# Patient Record
Sex: MALE | Race: WHITE | Employment: OTHER | URBAN - METROPOLITAN AREA
[De-identification: names, ages, dates, MRNs, and addresses within clinical notes are randomized per-mention and may not be internally consistent; named-entity substitution may affect disease eponyms.]

---

## 2019-04-25 ENCOUNTER — ANESTHESIA EVENT (OUTPATIENT)
Dept: OPERATING ROOM | Age: 24
End: 2019-04-25
Payer: MEDICARE

## 2019-04-25 RX ORDER — BISACODYL 10 MG
10 SUPPOSITORY, RECTAL RECTAL DAILY PRN
COMMUNITY

## 2019-04-25 RX ORDER — FLUTICASONE PROPIONATE 50 MCG
2 SPRAY, SUSPENSION (ML) NASAL 2 TIMES DAILY
COMMUNITY

## 2019-04-25 NOTE — PROGRESS NOTES
Phone assessment completed with Traci Peterson, at [facility]. Pt is profound mr. Alert to self. Ambulates, non verbalr. No isolation and no open wounds. Pt has legal guardian. Docs on chart. Pt to be transported and accompanied by staff.

## 2019-04-25 NOTE — PROGRESS NOTES
[address] [facility] PRE-ADMISSION TESTING INSTRUCTIONS    The Preadmission Testing patient is instructed accordingly using the following criteria (check applicable):    ARRIVAL INSTRUCTIONS:  [x] Parking the day of Surgery is located in the Main Entrance lot. Upon entering the door, make an immediate right to the surgery reception desk    [x] Complimentary [address] Parking is available Monday through Friday 6 am to 6 pm    [x] Bring photo ID and insurance card    [] Bring in a copy of Living will or Durable Power of  papers. [x] Please be sure to arrange for responsible adult to provide transportation to and from the hospital    [x] Please arrange for responsible adult to be with you for the 24 hour period post procedure due to having anesthesia      GENERAL INSTRUCTIONS:    [x] Nothing by mouth after midnight, including gum, candy, mints or water    [x] You may brush your teeth, but do not swallow any water    [] Take medications as instructed with 1-2 oz of water    [] Stop herbal supplements and vitamins 5 days prior to procedure    [] Follow preop dosing of blood thinners per physician instructions    [] Take 1/2 dose of evening insulin, but no insulin after midnight    [] No oral diabetic medications after midnight    [] If diabetic and have low blood sugar or feel symptomatic, take 1-2oz apple juice only    [] Bring inhalers day of surgery    [] Bring C-PAP/ Bi-Pap day of surgery    [] Bring urine specimen day of surgery    [x] Shower or bath with soap, lather and rinse well, AM of Surgery, no lotion, powders or creams to surgical site    [] Follow bowel prep as instructed per surgeon    [x] No tobacco products within 24 hours of surgery     [x] No alcohol or illegal drug use within 24 hours of surgery.     [x] Jewelry, body piercing's, eyeglasses, contact lenses and dentures are not permitted into surgery (bring cases)      [] Please do not wear any nail polish, make up or hair

## 2019-04-26 ENCOUNTER — ANESTHESIA (OUTPATIENT)
Dept: OPERATING ROOM | Age: 24
End: 2019-04-26
Payer: MEDICARE

## 2019-04-26 ENCOUNTER — HOSPITAL ENCOUNTER (OUTPATIENT)
Age: 24
Setting detail: OUTPATIENT SURGERY
Discharge: OTHER INSTITUTION | End: 2019-04-26
Attending: DENTIST | Admitting: DENTIST
Payer: MEDICARE

## 2019-04-26 VITALS
RESPIRATION RATE: 3 BRPM | OXYGEN SATURATION: 99 % | DIASTOLIC BLOOD PRESSURE: 61 MMHG | SYSTOLIC BLOOD PRESSURE: 111 MMHG

## 2019-04-26 VITALS
WEIGHT: 132 LBS | RESPIRATION RATE: 18 BRPM | HEART RATE: 78 BPM | SYSTOLIC BLOOD PRESSURE: 122 MMHG | TEMPERATURE: 96.5 F | DIASTOLIC BLOOD PRESSURE: 91 MMHG | BODY MASS INDEX: 20 KG/M2 | HEIGHT: 68 IN | OXYGEN SATURATION: 97 %

## 2019-04-26 PROCEDURE — 6360000002 HC RX W HCPCS

## 2019-04-26 PROCEDURE — 3700000000 HC ANESTHESIA ATTENDED CARE: Performed by: DENTIST

## 2019-04-26 PROCEDURE — 3700000001 HC ADD 15 MINUTES (ANESTHESIA): Performed by: DENTIST

## 2019-04-26 PROCEDURE — 3600000002 HC SURGERY LEVEL 2 BASE: Performed by: DENTIST

## 2019-04-26 PROCEDURE — 7100000011 HC PHASE II RECOVERY - ADDTL 15 MIN: Performed by: DENTIST

## 2019-04-26 PROCEDURE — 2500000003 HC RX 250 WO HCPCS

## 2019-04-26 PROCEDURE — 3600000012 HC SURGERY LEVEL 2 ADDTL 15MIN: Performed by: DENTIST

## 2019-04-26 PROCEDURE — 7100000000 HC PACU RECOVERY - FIRST 15 MIN: Performed by: DENTIST

## 2019-04-26 PROCEDURE — 7100000001 HC PACU RECOVERY - ADDTL 15 MIN: Performed by: DENTIST

## 2019-04-26 PROCEDURE — 2580000003 HC RX 258

## 2019-04-26 PROCEDURE — 2709999900 HC NON-CHARGEABLE SUPPLY: Performed by: DENTIST

## 2019-04-26 PROCEDURE — 7100000010 HC PHASE II RECOVERY - FIRST 15 MIN: Performed by: DENTIST

## 2019-04-26 RX ORDER — FENTANYL CITRATE 50 UG/ML
INJECTION, SOLUTION INTRAMUSCULAR; INTRAVENOUS PRN
Status: DISCONTINUED | OUTPATIENT
Start: 2019-04-26 | End: 2019-04-26 | Stop reason: SDUPTHER

## 2019-04-26 RX ORDER — NEOSTIGMINE METHYLSULFATE 1 MG/ML
INJECTION, SOLUTION INTRAVENOUS PRN
Status: DISCONTINUED | OUTPATIENT
Start: 2019-04-26 | End: 2019-04-26 | Stop reason: SDUPTHER

## 2019-04-26 RX ORDER — MIDAZOLAM HYDROCHLORIDE 1 MG/ML
INJECTION INTRAMUSCULAR; INTRAVENOUS
Status: COMPLETED
Start: 2019-04-26 | End: 2019-04-26

## 2019-04-26 RX ORDER — LIDOCAINE HYDROCHLORIDE 20 MG/ML
INJECTION, SOLUTION EPIDURAL; INFILTRATION; INTRACAUDAL; PERINEURAL PRN
Status: DISCONTINUED | OUTPATIENT
Start: 2019-04-26 | End: 2019-04-26 | Stop reason: SDUPTHER

## 2019-04-26 RX ORDER — MIDAZOLAM HYDROCHLORIDE 1 MG/ML
2 INJECTION INTRAMUSCULAR; INTRAVENOUS ONCE
Status: COMPLETED | OUTPATIENT
Start: 2019-04-26 | End: 2019-04-26

## 2019-04-26 RX ORDER — DEXAMETHASONE SODIUM PHOSPHATE 4 MG/ML
INJECTION, SOLUTION INTRA-ARTICULAR; INTRALESIONAL; INTRAMUSCULAR; INTRAVENOUS; SOFT TISSUE PRN
Status: DISCONTINUED | OUTPATIENT
Start: 2019-04-26 | End: 2019-04-26 | Stop reason: SDUPTHER

## 2019-04-26 RX ORDER — ROCURONIUM BROMIDE 10 MG/ML
INJECTION, SOLUTION INTRAVENOUS PRN
Status: DISCONTINUED | OUTPATIENT
Start: 2019-04-26 | End: 2019-04-26 | Stop reason: SDUPTHER

## 2019-04-26 RX ORDER — PROPOFOL 10 MG/ML
INJECTION, EMULSION INTRAVENOUS PRN
Status: DISCONTINUED | OUTPATIENT
Start: 2019-04-26 | End: 2019-04-26 | Stop reason: SDUPTHER

## 2019-04-26 RX ORDER — SODIUM CHLORIDE 9 MG/ML
INJECTION, SOLUTION INTRAVENOUS CONTINUOUS PRN
Status: DISCONTINUED | OUTPATIENT
Start: 2019-04-26 | End: 2019-04-26 | Stop reason: SDUPTHER

## 2019-04-26 RX ORDER — GLYCOPYRROLATE 1 MG/5 ML
SYRINGE (ML) INTRAVENOUS PRN
Status: DISCONTINUED | OUTPATIENT
Start: 2019-04-26 | End: 2019-04-26 | Stop reason: SDUPTHER

## 2019-04-26 RX ADMIN — PROPOFOL 150 MG: 10 INJECTION, EMULSION INTRAVENOUS at 10:37

## 2019-04-26 RX ADMIN — SODIUM CHLORIDE: 9 INJECTION, SOLUTION INTRAVENOUS at 10:33

## 2019-04-26 RX ADMIN — LIDOCAINE HYDROCHLORIDE 60 MG: 20 INJECTION, SOLUTION EPIDURAL; INFILTRATION; INTRACAUDAL; PERINEURAL at 10:37

## 2019-04-26 RX ADMIN — ROCURONIUM BROMIDE 30 MG: 10 SOLUTION INTRAVENOUS at 10:37

## 2019-04-26 RX ADMIN — Medication 3 MG: at 10:58

## 2019-04-26 RX ADMIN — Medication 0.4 MG: at 10:58

## 2019-04-26 RX ADMIN — DEXAMETHASONE SODIUM PHOSPHATE 10 MG: 4 INJECTION, SOLUTION INTRAMUSCULAR; INTRAVENOUS at 10:37

## 2019-04-26 RX ADMIN — MIDAZOLAM HYDROCHLORIDE 2 MG: 1 INJECTION INTRAMUSCULAR; INTRAVENOUS at 09:57

## 2019-04-26 RX ADMIN — FENTANYL CITRATE 50 MCG: 50 INJECTION, SOLUTION INTRAMUSCULAR; INTRAVENOUS at 10:37

## 2019-04-26 RX ADMIN — MIDAZOLAM HYDROCHLORIDE 2 MG: 1 INJECTION, SOLUTION INTRAMUSCULAR; INTRAVENOUS at 09:57

## 2019-04-26 ASSESSMENT — PULMONARY FUNCTION TESTS
PIF_VALUE: 15
PIF_VALUE: 8
PIF_VALUE: 14
PIF_VALUE: 14
PIF_VALUE: 12
PIF_VALUE: 21
PIF_VALUE: 12
PIF_VALUE: 15
PIF_VALUE: 20
PIF_VALUE: 15
PIF_VALUE: 16
PIF_VALUE: 14
PIF_VALUE: 25
PIF_VALUE: 24
PIF_VALUE: 15
PIF_VALUE: 14
PIF_VALUE: 14
PIF_VALUE: 2
PIF_VALUE: 13
PIF_VALUE: 12
PIF_VALUE: 11
PIF_VALUE: 10
PIF_VALUE: 15
PIF_VALUE: 4
PIF_VALUE: 12
PIF_VALUE: 1
PIF_VALUE: 14
PIF_VALUE: 14
PIF_VALUE: 15
PIF_VALUE: 22
PIF_VALUE: 14
PIF_VALUE: 23
PIF_VALUE: 22
PIF_VALUE: 15
PIF_VALUE: 15
PIF_VALUE: 30
PIF_VALUE: 15
PIF_VALUE: 14
PIF_VALUE: 9
PIF_VALUE: 2
PIF_VALUE: 12
PIF_VALUE: 16
PIF_VALUE: 10
PIF_VALUE: 12
PIF_VALUE: 14
PIF_VALUE: 13

## 2019-04-26 ASSESSMENT — PAIN SCALES - WONG BAKER
WONGBAKER_NUMERICALRESPONSE: 0

## 2019-04-26 ASSESSMENT — PAIN - FUNCTIONAL ASSESSMENT: PAIN_FUNCTIONAL_ASSESSMENT: FACES

## 2019-04-26 NOTE — ANESTHESIA POSTPROCEDURE EVALUATION
Department of Anesthesiology  Postprocedure Note    Patient: Tara Harris  MRN: [id number]  YOB: 1995  Date of evaluation: 4/26/2019  Time:  12:16 PM     Procedure Summary     Date:  04/26/19 Room / Location:  [facility] OR 09 / [facility] OR    Anesthesia Start:  1034 Anesthesia Stop:  1128    Procedure:  DENTAL EXAM,  PROPHY (N/A ) Diagnosis:  (DENTAL CARIES)    Surgeon:  Sydni Man DDS Responsible Provider:  Tayla Hall MD    Anesthesia Type:  general ASA Status:  3          Anesthesia Type: general    Yas Phase I: Yas Score: 9    Yas Phase II:      Last vitals: Reviewed and per EMR flowsheets.        Anesthesia Post Evaluation    Patient location during evaluation: PACU  Patient participation: complete - patient cannot participate (nonverbal)  Level of consciousness: awake  Pain score: 0  Airway patency: patent  Nausea & Vomiting: no vomiting  Complications: no  Respiratory status: spontaneous ventilation  Hydration status: stable

## 2019-04-26 NOTE — OP NOTE
[address] [address]                                OPERATIVE REPORT    PATIENT NAME: Mauricio Haines                :        1995  MED REC NO:   [id number]                            ROOM:  ACCOUNT NO:   [de-identified]                           ADMIT DATE: 2019  PROVIDER:     Abdirahman Tucker    DATE OF PROCEDURE:  2019    PREOPERATIVE DIAGNOSIS:  Dental caries. POSTOPERATIVE DIAGNOSIS:  Dental caries. SURGEON:  Dr. Abdirahman Tucker. ANESTHESIA:  General endotracheal.    ESTIMATED BLOOD LOSS:  Minimal.    FLUIDS:  300 mL normal saline. MEDICATIONS:  None. COMPLICATIONS:  None. PROCEDURE PERFORMED:  Oral exam, prophy fillings, extractions. PREOPERATIVE NOTE:  Risks and benefits were discussed with the patient's  guardian prior to surgery and the guardian consented. The patient was  not a candidate for an office sedation due to unsuccessful attempts in  the office and severe anxiety. DESCRIPTION OF PROCEDURE:  The patient was brought to the OR in supine  position and prepped and draped for oral procedure. After review of  films, we noticed the patient had severe plaque and calculus buildup. We did a deep Cavitron scale and prophy. Periodontal readings were done  in the chart. Throat pack was removed. The patient transferred to PACU  in good condition.         Jesús Singh    D: 2019 10:59:44       T: 2019 11:23:51     [email or web address]  Job#: [id number]     Doc#: [id number]    CC:

## 2019-04-26 NOTE — ANESTHESIA PRE PROCEDURE
Department of Anesthesiology  Preprocedure Note       Name:  Ruth Charles   Age:  20 y.o.  :  1995                                          MRN:  [id number]         Date:  2019      Surgeon: Neris Valera):  Dionisio Rodríguez DDS    Procedure: DENTAL EXAM XRAYS PROPHY FILLINGS EXTRACTIONS (N/A )    Medications prior to admission:   Prior to Admission medications    Medication Sig Start Date End Date Taking? Authorizing Provider   fluticasone (FLONASE) 50 MCG/ACT nasal spray 2 sprays by Each Nare route 2 times daily   Yes Historical Provider, MD   bisacodyl (DULCOLAX) 10 MG suppository Place 10 mg rectally daily as needed for Constipation   Yes Historical Provider, MD   guaiFENesin (ROBITUSSIN) 100 MG/5ML syrup Take 200 mg by mouth every 4 hours as needed for Cough   Yes Historical Provider, MD   silver sulfADIAZINE (SILVADENE) 1 % cream Apply topically as needed Apply topically daily. Yes Historical Provider, MD   clonazePAM (KLONOPIN) 1 MG tablet Take 1 mg by mouth nightly . Yes Historical Provider, MD   divalproex (DEPAKOTE) 250 MG DR tablet Take 250 mg by mouth Daily with lunch   Yes Historical Provider, MD   lactulose (CEPHULAC) 10 g packet Take 10 g by mouth daily   Yes Historical Provider, MD   LevETIRAcetam (KEPPRA PO) Take 2,000 mg by mouth every evening   Yes Historical Provider, MD   diphenhydrAMINE (BENADRYL) 50 MG capsule Take 50 mg by mouth as needed   Yes Historical Provider, MD   polyethylene glycol (GLYCOLAX) powder Take 17 g by mouth as needed   Yes Historical Provider, MD   clonazePAM (KLONOPIN) 0.5 MG tablet Take 0.5 mg by mouth 2 times daily. Instructed to take am of procedure   Yes Historical Provider, MD   levETIRAcetam (KEPPRA) 500 MG tablet Take 1,500 mg by mouth daily Instructed to take am of procedure   Yes Historical Provider, MD   lacosamide (VIMPAT) 50 MG TABS tablet Take 200 mg by mouth 2 times daily.  Instructed to take am of procedure   Yes Historical Provider, MD acetaminophen (TYLENOL) 500 MG tablet Take 500 mg by mouth every 6 hours as needed for Pain   Yes Historical Provider, MD   divalproex (DEPAKOTE) 500 MG DR tablet Take 500 mg by mouth 2 times daily Instructed to take am of procedure   Yes Historical Provider, MD       Current medications:    No current facility-administered medications for this encounter. Allergies:  No Known Allergies    Problem List:    Patient Active Problem List   Diagnosis Code    Dental caries K02.9       Past Medical History:        Diagnosis Date    Anxiety     Dental caries     Fetal alcohol syndrome     [facility]romuscular [facility] ([facility]     OCD (obsessive compulsive disorder)     Profound intellectual disabilities     Seizure disorder ([facility]     last seizure in Sept. 2017 d/t high fever, but none for a \"while\" before thiat       Past Surgical History:        Procedure Laterality Date    DENTAL SURGERY Bilateral 09/23/2016    DEEP DENTAL CLEANING,PROPHY,RESTORATION,EXAM    DENTAL SURGERY  02/02/2018    dental exam cleaning extraction       Social History:    Social History     Tobacco Use    Smoking status: Never Smoker    Smokeless tobacco: Never Used   Substance Use Topics    Alcohol use: No                                Counseling given: Not Answered      Vital Signs (Current):   Vitals:    04/25/19 1644 04/26/19 0940   Resp:  18   Temp:  96.8 °F (36 °C)   TempSrc:  Temporal   Weight: 132 lb (59.9 kg)    Height: 5' 8\" (1.727 m)                                               BP Readings from Last 3 Encounters:   02/02/18 128/82   09/23/16 121/59       NPO Status:                                                                                 BMI:   Wt Readings from Last 3 Encounters:   04/25/19 132 lb (59.9 kg)   02/02/18 142 lb (64.4 kg)   12/01/17 141 lb (64 kg)     Body mass index is 20.07 kg/m².     CBC: No results found for: WBC, RBC, HGB, HCT, MCV, RDW, PLT    CMP: No results found for: NA, K, CL, CO2, BUN, CREATININE, GFRAA, AGRATIO, LABGLOM, GLUCOSE, PROT, CALCIUM, BILITOT, ALKPHOS, AST, ALT    POC Tests: No results for input(s): POCGLU, POCNA, POCK, POCCL, POCBUN, POCHEMO, POCHCT in the last 72 hours. Coags: No results found for: PROTIME, INR, APTT    HCG (If Applicable): No results found for: PREGTESTUR, PREGSERUM, HCG, HCGQUANT     ABGs: No results found for: PHART, PO2ART, RSR0BET, URA3COW, BEART, S8JCCIOQ     Type & Screen (If Applicable):  No results found for: LABABO, [address]    Anesthesia Evaluation  Patient summary reviewed and Nursing notes reviewed no history of anesthetic complications:   Airway:        Comment: Uncooperative with exam   Dental:          Pulmonary: breath sounds clear to auscultation                             Cardiovascular:Negative CV ROS  Exercise tolerance: good (>4 METS),           Rhythm: regular  Rate: normal                    Neuro/Psych:   (+) neuromuscular disease:, psychiatric history:             ROS comment: Severe MR    nonverbal GI/Hepatic/Renal: Neg GI/Hepatic/Renal ROS            Endo/Other: Negative Endo/Other ROS                    Abdominal:           Vascular: negative vascular ROS. Anesthesia Plan      general     ASA 3       Induction: intravenous. MIPS: Postoperative opioids intended and Prophylactic antiemetics administered. Anesthetic plan and risks discussed with patient and legal guardian. Plan discussed with CRNA and surgical team.                  Ashley Brennan MD   4/26/2019      DOS STAFF ADDENDUM:    Pt seen and examined, physical exam updated, chart reviewed including anesthesia, drug and allergy history. H&P reviewed. No interval changes to history or physical examination (unless noted above). NPO status confirmed. Anesthetic plan, risks, benefits, alternatives discussed with patient. Patient verbalized an understanding and agrees to proceed.      Lupillo Wu MD  Staff Anesthesiologist

## 2019-04-26 NOTE — PROGRESS NOTES
Pt pulling at lines and monitors, moving all over bed in circles, trying to get out of bed. Unable to get BP's.

## 2019-04-26 NOTE — PROGRESS NOTES
Unable to obtain vital signs or pulse ox. Patient picking and pulling at everything. Does not sit still. Caregiver at bedside with call light within reach. Instructed to call for Nurse if patient calms enough to get pulse ox reading.

## (undated) DEVICE — BASIC SINGLE BASIN 1-LF: Brand: MEDLINE INDUSTRIES, INC.

## (undated) DEVICE — TOWEL,OR,DSP,ST,BLUE,STD,6/PK,12PK/CS: Brand: MEDLINE

## (undated) DEVICE — TUBING SUCT 12FR MAL ALUM SHFT FN CAP VENT UNIV CONN W/ OBT

## (undated) DEVICE — YANKAUER,OPEN TIP,W/O VENT,STERILE: Brand: MEDLINE INDUSTRIES, INC.

## (undated) DEVICE — GOWN,SIRUS,FABRNF,2XL,18/CS: Brand: MEDLINE

## (undated) DEVICE — SOLUTION IV IRRIG WATER 1000ML POUR BRL 2F7114

## (undated) DEVICE — COUNTER NDL 30 COUNT DBL MAG

## (undated) DEVICE — MARKER,SKIN,WI/RULER AND LABELS: Brand: MEDLINE

## (undated) DEVICE — 4-PORT MANIFOLD: Brand: NEPTUNE 2

## (undated) DEVICE — COVER,LIGHT HANDLE,FLX,2/PK: Brand: MEDLINE INDUSTRIES, INC.

## (undated) DEVICE — SET DENTAL PROPHY

## (undated) DEVICE — SURGICAL PROCEDURE PACK EENT CUST